# Patient Record
Sex: FEMALE | Race: WHITE | Employment: UNEMPLOYED | URBAN - NONMETROPOLITAN AREA
[De-identification: names, ages, dates, MRNs, and addresses within clinical notes are randomized per-mention and may not be internally consistent; named-entity substitution may affect disease eponyms.]

---

## 2017-12-21 ENCOUNTER — OFFICE VISIT (OUTPATIENT)
Dept: OPTOMETRY | Age: 22
End: 2017-12-21
Payer: COMMERCIAL

## 2017-12-21 DIAGNOSIS — H52.03 HYPEROPIA OF BOTH EYES WITH ASTIGMATISM: Primary | ICD-10-CM

## 2017-12-21 DIAGNOSIS — H52.203 HYPEROPIA OF BOTH EYES WITH ASTIGMATISM: Primary | ICD-10-CM

## 2017-12-21 PROCEDURE — 92014 COMPRE OPH EXAM EST PT 1/>: CPT | Performed by: OPTOMETRIST

## 2017-12-21 RX ORDER — BENOXINATE HCL/FLUORESCEIN SOD 0.4%-0.25%
1 DROPS OPHTHALMIC (EYE) ONCE
Status: COMPLETED | OUTPATIENT
Start: 2017-12-21 | End: 2017-12-21

## 2017-12-21 RX ADMIN — Medication 1 DROP: at 10:32

## 2017-12-21 ASSESSMENT — REFRACTION_MANIFEST
OS_SPHERE: +3.75
OS_AXIS: 030
OD_AXIS: 145
OS_CYLINDER: -0.50
OD_SPHERE: +3.50
OD_CYLINDER: -0.75

## 2017-12-21 ASSESSMENT — REFRACTION_WEARINGRX
OS_SPHERE: +3.50
OD_CYLINDER: -0.50
OS_AXIS: 025
OS_CYLINDER: -0.50
SPECS_TYPE: SVL
OD_AXIS: 150
OD_SPHERE: +3.00

## 2017-12-21 ASSESSMENT — VISUAL ACUITY
CORRECTION_TYPE: GLASSES
OS_CC: 20/25
METHOD: SNELLEN - LINEAR

## 2017-12-21 ASSESSMENT — KERATOMETRY
OS_K2POWER_DIOPTERS: 44.50
OS_AXISANGLE_DEGREES: 114
OD_AXISANGLE_DEGREES: 073
OD_AXISANGLE2_DEGREES: 163
OS_AXISANGLE2_DEGREES: 024
OD_K2POWER_DIOPTERS: 44.50
OS_K1POWER_DIOPTERS: 43.50
OD_K1POWER_DIOPTERS: 43.50

## 2017-12-21 ASSESSMENT — TONOMETRY
OS_IOP_MMHG: 17
IOP_METHOD: APPLANATION W FLURESS DROP
OD_IOP_MMHG: 17

## 2017-12-21 ASSESSMENT — SLIT LAMP EXAM - LIDS
COMMENTS: NORMAL
COMMENTS: NORMAL

## 2021-07-14 ENCOUNTER — OFFICE VISIT (OUTPATIENT)
Dept: FAMILY MEDICINE CLINIC | Age: 26
End: 2021-07-14
Payer: MEDICARE

## 2021-07-14 VITALS
RESPIRATION RATE: 16 BRPM | SYSTOLIC BLOOD PRESSURE: 102 MMHG | TEMPERATURE: 97.7 F | WEIGHT: 196 LBS | HEART RATE: 76 BPM | DIASTOLIC BLOOD PRESSURE: 60 MMHG | OXYGEN SATURATION: 98 %

## 2021-07-14 DIAGNOSIS — R19.7 DIARRHEA, UNSPECIFIED TYPE: ICD-10-CM

## 2021-07-14 DIAGNOSIS — R11.2 NON-INTRACTABLE VOMITING WITH NAUSEA, UNSPECIFIED VOMITING TYPE: Primary | ICD-10-CM

## 2021-07-14 PROCEDURE — 99203 OFFICE O/P NEW LOW 30 MIN: CPT | Performed by: NURSE PRACTITIONER

## 2021-07-14 PROCEDURE — G8421 BMI NOT CALCULATED: HCPCS | Performed by: NURSE PRACTITIONER

## 2021-07-14 PROCEDURE — 99212 OFFICE O/P EST SF 10 MIN: CPT | Performed by: NURSE PRACTITIONER

## 2021-07-14 PROCEDURE — 4004F PT TOBACCO SCREEN RCVD TLK: CPT | Performed by: NURSE PRACTITIONER

## 2021-07-14 PROCEDURE — G8427 DOCREV CUR MEDS BY ELIG CLIN: HCPCS | Performed by: NURSE PRACTITIONER

## 2021-07-14 RX ORDER — QUETIAPINE FUMARATE 25 MG/1
TABLET, FILM COATED ORAL
COMMUNITY
Start: 2021-06-03

## 2021-07-14 SDOH — ECONOMIC STABILITY: FOOD INSECURITY: WITHIN THE PAST 12 MONTHS, YOU WORRIED THAT YOUR FOOD WOULD RUN OUT BEFORE YOU GOT MONEY TO BUY MORE.: NEVER TRUE

## 2021-07-14 SDOH — ECONOMIC STABILITY: FOOD INSECURITY: WITHIN THE PAST 12 MONTHS, THE FOOD YOU BOUGHT JUST DIDN'T LAST AND YOU DIDN'T HAVE MONEY TO GET MORE.: NEVER TRUE

## 2021-07-14 ASSESSMENT — PATIENT HEALTH QUESTIONNAIRE - PHQ9
SUM OF ALL RESPONSES TO PHQ9 QUESTIONS 1 & 2: 0
2. FEELING DOWN, DEPRESSED OR HOPELESS: 0
SUM OF ALL RESPONSES TO PHQ QUESTIONS 1-9: 0
SUM OF ALL RESPONSES TO PHQ QUESTIONS 1-9: 0
1. LITTLE INTEREST OR PLEASURE IN DOING THINGS: 0
SUM OF ALL RESPONSES TO PHQ QUESTIONS 1-9: 0

## 2021-07-14 ASSESSMENT — ENCOUNTER SYMPTOMS
COUGH: 0
VOMITING: 1
ABDOMINAL PAIN: 0
SHORTNESS OF BREATH: 0
DIARRHEA: 1
WHEEZING: 0
NAUSEA: 1

## 2021-07-14 ASSESSMENT — SOCIAL DETERMINANTS OF HEALTH (SDOH): HOW HARD IS IT FOR YOU TO PAY FOR THE VERY BASICS LIKE FOOD, HOUSING, MEDICAL CARE, AND HEATING?: NOT HARD AT ALL

## 2021-07-14 NOTE — PROGRESS NOTES
[address]  [address] [address]  Dept: [phone]  Dept Fax: [phone]  Loc: [phone]        CHIEF COMPLAINT       Chief Complaint   Patient presents with    Emesis     diarrhea, fatigue  Sx for 2 days. She reports to have vomited 6-7 times- anytime she tries to eat. She states she has had one dose of Moderna Covid vaccine- overdue for 2nd. Nurses Notes reviewed and I agree except as noted in the HPI. HISTORY OF PRESENT ILLNESS   Satnam Manjarrez is a 27 y.o. female who presents to [facility] today (7/14/2021) for evaluation of:   Nausea & Vomiting  This is a new problem. The current episode started in the past 7 days (7/12/21). The problem occurs 2 to 4 times per day (last emesis was last night). The problem has been unchanged. Associated symptoms include fatigue, nausea and vomiting (last emesis last night). Pertinent negatives include no abdominal pain, chest pain, chills, congestion, coughing or fever. She has tried nothing for the symptoms. Brother was ill with similar symptoms, was seen in walk-in care yesterday. Pt states rides to work with brother regularly. Pt has received one dose of 2-step covid vaccine. REVIEW OF SYSTEMS     Review of Systems   Constitutional: Positive for fatigue. Negative for chills and fever. HENT: Negative for congestion. Respiratory: Negative for cough, shortness of breath and wheezing. Cardiovascular: Negative for chest pain. Gastrointestinal: Positive for diarrhea (ocurring every couple hours), nausea and vomiting (last emesis last night). Negative for abdominal pain. PAST MEDICAL HISTORY         Diagnosis Date    ADHD (attention deficit hyperactivity disorder)     Hyperopia with astigmatism        SURGICAL HISTORY     Patient  has no past surgical history on file.     CURRENT MEDICATIONS Outpatient Medications Prior to Visit   Medication Sig Dispense Refill    amphetamine-dextroamphetamine (ADDERALL, 30MG,) 30 MG tablet Take 20 mg by mouth daily.  QUEtiapine (SEROQUEL) 25 MG tablet take 1 tablet by mouth once daily      ALPRAZolam (XANAX) 0.5 MG tablet  (Patient not taking: Reported on 7/14/2021)      PROAIR  (90 BASE) MCG/ACT inhaler  (Patient not taking: Reported on 7/14/2021)       No facility-administered medications prior to visit. ALLERGIES     Patient is is allergic to morphine, zoloft [sertraline], bactrim [sulfamethoxazole-trimethoprim], and penicillins. FAMILY HISTORY     Patient's family history is not on file. SOCIAL HISTORY     Patient  reports that she has been smoking. She has never used smokeless tobacco.    PHYSICAL EXAM     VITALS  BP: 102/60, Temp: 97.7 °F (36.5 °C), Pulse: 76, Resp: 16, SpO2: 98 %  Physical Exam  Vitals reviewed. Constitutional:       General: She is not in acute distress. Appearance: She is not ill-appearing. HENT:      Mouth/Throat:      Mouth: Mucous membranes are moist.   Cardiovascular:      Rate and Rhythm: Normal rate and regular rhythm. Pulmonary:      Effort: Pulmonary effort is normal. No respiratory distress. Breath sounds: Normal breath sounds. No wheezing or rhonchi. Abdominal:      General: Bowel sounds are increased. There is no distension. Palpations: Abdomen is soft. Tenderness: There is no abdominal tenderness. There is no guarding or rebound. Musculoskeletal:      Cervical back: Normal range of motion and neck supple. Lymphadenopathy:      Cervical: No cervical adenopathy. Skin:     General: Skin is warm and dry. Capillary Refill: Capillary refill takes less than 2 seconds. Neurological:      General: No focal deficit present. Mental Status: She is alert. DIAGNOSTIC RESULTS   Labs:No results found for this visit on 07/14/21.     IMAGING:        CLINICAL COURSE: Vitals:    07/14/21 1149   BP: 102/60   Site: Right Upper Arm   Position: Sitting   Cuff Size: Medium Adult   Pulse: 76   Resp: 16   Temp: 97.7 °F (36.5 °C)   SpO2: 98%   Weight: 196 lb (88.9 kg)           PROCEDURES:  None  FINAL IMPRESSION      1. Non-intractable vomiting with nausea, unspecified vomiting type    2. Diarrhea, unspecified type         DISPOSITION/PLAN   Discussed with pt that GI symptoms are likely related to viral infection. Discussed supportive measures such as increasing fluids, adding electrolyte replacement drinks, eating bland, BRAT diet foods, and trying pepto or imodium if needed. Instructed pt to encourage all family members to wash hands well and to clean common surfaces to prevent spreading virus. Monitor symptoms; if worsening or failing to improve, return to clinic. Patient Instructions     Recommend increasing your water intake. For diarrhea, adding electrolyte replacements such as pedialyte or gatorade is recommended. Suggest eating bland, mild foods such as crackers, toast, rice, bananas, applesauce, jello, chicken soup, potatoes, noodles, oatmeal, etc.  Avoid high-fat, greasy, heavy foods until you are feeling better. Dairy can also be difficult to digest at this time, although yogurt can be beneficial. Can take pepto or imodium for symptoms. Monitor your symptoms. If symptoms worsen, fail to improve, or you develop new symptoms, please return to clinic for further evaluation. Patient Education        Nausea and Vomiting: Care Instructions  Your Care Instructions     When you are nauseated, you may feel weak and sweaty and notice a lot of saliva in your mouth. Nausea often leads to vomiting. Most of the time you do not need to worry about nausea and vomiting, but they can be signs of other illnesses. Two common causes of nausea and vomiting are stomach flu and food poisoning.  Nausea and vomiting from viral stomach flu will usually start to improve within 24 hours. Nausea and vomiting from food poisoning may last from 12 to 48 hours. The doctor has checked you carefully, but problems can develop later. If you notice any problems or new symptoms, get medical treatment right away. Follow-up care is a key part of your treatment and safety. Be sure to make and go to all appointments, and call your doctor if you are having problems. It's also a good idea to know your test results and keep a list of the medicines you take. How can you care for yourself at home? · To prevent dehydration, drink plenty of fluids. Choose water and other caffeine-free clear liquids until you feel better. If you have kidney, heart, or liver disease and have to limit fluids, talk with your doctor before you increase the amount of fluids you drink. · Rest in bed until you feel better. · When you are able to eat, try clear soups, mild foods, and liquids until all symptoms are gone for 12 to 48 hours. Other good choices include dry toast, crackers, cooked cereal, and gelatin dessert, such as Jell-O. When should you call for help? Call 911 anytime you think you may need emergency care. For example, call if:    · You passed out (lost consciousness). Call your doctor now or seek immediate medical care if:    · You have symptoms of dehydration, such as:  ? Dry eyes and a dry mouth. ? Passing only a little urine. ? Feeling thirstier than usual.     · You have new or worsening belly pain.     · You have a new or higher fever.     · You vomit blood or what looks like coffee grounds. Watch closely for changes in your health, and be sure to contact your doctor if:    · You have ongoing nausea and vomiting.     · Your vomiting is getting worse.     · Your vomiting lasts longer than 2 days.     · You are not getting better as expected. Where can you learn more? Go to https://chbeenaeb.One[employer] Water. org and sign in to your [facility] account.  Enter 20 [id number] in the [employer] box to clear liquids until you feel better. If you have kidney, heart, or liver disease and have to limit fluids, talk with your doctor before you increase the amount of fluids you drink. · Begin eating small amounts of mild foods the next day, if you feel like it. ? Try yogurt that has live cultures of Lactobacillus. (Check the label.)  ? Avoid spicy foods, fruits, alcohol, and caffeine until 48 hours after all symptoms are gone. ? Avoid chewing gum that contains sorbitol. ? Avoid dairy products (except for yogurt with Lactobacillus) while you have diarrhea and for 3 days after symptoms are gone. · The doctor may recommend that you take over-the-counter medicine, such as loperamide (Imodium), if you still have diarrhea after 6 hours. Read and follow all instructions on the label. Do not use this medicine if you have bloody diarrhea, a high fever, or other signs of serious illness. Call your doctor if you think you are having a problem with your medicine. When should you call for help? Call 911 anytime you think you may need emergency care. For example, call if:    · You passed out (lost consciousness).     · Your stools are maroon or very bloody. Call your doctor now or seek immediate medical care if:    · You are dizzy or lightheaded, or you feel like you may faint.     · Your stools are black and look like tar, or they have streaks of blood.     · You have new or worse belly pain.     · You have symptoms of dehydration, such as:  ? Dry eyes and a dry mouth. ? Passing only a little urine. ? Feeling thirstier than usual.     · You have a new or higher fever. Watch closely for changes in your health, and be sure to contact your doctor if:    · Your diarrhea is getting worse.     · You see pus in the diarrhea.     · You are not getting better after 2 days (48 hours). Where can you learn more? Go to https://chpedoeb.KnexxLocal. org and sign in to your [employer] account.  Enter Q006 in the [address] Information box to learn more about \"Diarrhea: Care Instructions. \"     If you do not have an account, please click on the \"Sign Up Now\" link. Current as of: October 19, 2020               Content Version: 12.9  © 2006-2021 Healthwise, Incorporated. Care instructions adapted under license by [facility] ([facility]). If you have questions about a medical condition or this instruction, always ask your healthcare professional. [place] [address] any warranty or liability for your use of this information. The use, risks, benefits, and potential side effects of prescribed and/or recommended medications were discussed. All questions were answered and the patient/caregiver voiced understanding. No orders of the defined types were placed in this encounter. Outpatient Encounter Medications as of 7/14/2021   Medication Sig Dispense Refill    amphetamine-dextroamphetamine (ADDERALL, 30MG,) 30 MG tablet Take 20 mg by mouth daily.  QUEtiapine (SEROQUEL) 25 MG tablet take 1 tablet by mouth once daily      [DISCONTINUED] ALPRAZolam (XANAX) 0.5 MG tablet  (Patient not taking: Reported on 7/14/2021)      [DISCONTINUED] PROAIR  (90 BASE) MCG/ACT inhaler  (Patient not taking: Reported on 7/14/2021)       No facility-administered encounter medications on file as of 7/14/2021. Return if symptoms worsen or fail to improve.                 Electronically signed by JW Blackburn CNP on 7/14/2021 at 1:34 PM

## 2021-07-14 NOTE — PATIENT INSTRUCTIONS
Recommend increasing your water intake. For diarrhea, adding electrolyte replacements such as pedialyte or gatorade is recommended. Suggest eating bland, mild foods such as crackers, toast, rice, bananas, applesauce, jello, chicken soup, potatoes, noodles, oatmeal, etc.  Avoid high-fat, greasy, heavy foods until you are feeling better. Dairy can also be difficult to digest at this time, although yogurt can be beneficial. Can take pepto or imodium for symptoms. Monitor your symptoms. If symptoms worsen, fail to improve, or you develop new symptoms, please return to clinic for further evaluation. Patient Education        Nausea and Vomiting: Care Instructions  Your Care Instructions     When you are nauseated, you may feel weak and sweaty and notice a lot of saliva in your mouth. Nausea often leads to vomiting. Most of the time you do not need to worry about nausea and vomiting, but they can be signs of other illnesses. Two common causes of nausea and vomiting are stomach flu and food poisoning. Nausea and vomiting from viral stomach flu will usually start to improve within 24 hours. Nausea and vomiting from food poisoning may last from 12 to 48 hours. The doctor has checked you carefully, but problems can develop later. If you notice any problems or new symptoms, get medical treatment right away. Follow-up care is a key part of your treatment and safety. Be sure to make and go to all appointments, and call your doctor if you are having problems. It's also a good idea to know your test results and keep a list of the medicines you take. How can you care for yourself at home? · To prevent dehydration, drink plenty of fluids. Choose water and other caffeine-free clear liquids until you feel better. If you have kidney, heart, or liver disease and have to limit fluids, talk with your doctor before you increase the amount of fluids you drink. · Rest in bed until you feel better.   · When you are able to eat, try clear soups, mild foods, and liquids until all symptoms are gone for 12 to 48 hours. Other good choices include dry toast, crackers, cooked cereal, and gelatin dessert, such as Jell-O. When should you call for help? Call 911 anytime you think you may need emergency care. For example, call if:    · You passed out (lost consciousness). Call your doctor now or seek immediate medical care if:    · You have symptoms of dehydration, such as:  ? Dry eyes and a dry mouth. ? Passing only a little urine. ? Feeling thirstier than usual.     · You have new or worsening belly pain.     · You have a new or higher fever.     · You vomit blood or what looks like coffee grounds. Watch closely for changes in your health, and be sure to contact your doctor if:    · You have ongoing nausea and vomiting.     · Your vomiting is getting worse.     · Your vomiting lasts longer than 2 days.     · You are not getting better as expected. Where can you learn more? Go to https://Ibotta.[employer]s. org and sign in to your [employer] account. Enter 13 [id number] in the [employer] box to learn more about \"Nausea and Vomiting: Care Instructions. \"     If you do not have an account, please click on the \"Sign Up Now\" link. Current as of: October 19, 2020               Content Version: 12.9  © 0544-7244 Healthwise, Incorporated. Care instructions adapted under license by [facility] ([facility]). If you have questions about a medical condition or this instruction, always ask your healthcare professional. [place] [address] any warranty or liability for your use of this information. Patient Education        Diarrhea: Care Instructions  Your Care Instructions     Diarrhea is loose, watery stools (bowel movements). The exact cause is often hard to find. Sometimes diarrhea is your body's way of getting rid of what caused an upset stomach. Viruses, food poisoning, and many medicines can cause diarrhea.  Some people get diarrhea in response to emotional stress, anxiety, or certain foods. Almost everyone has diarrhea now and then. It usually isn't serious, and your stools will return to normal soon. The important thing to do is replace the fluids you have lost, so you can prevent dehydration. The doctor has checked you carefully, but problems can develop later. If you notice any problems or new symptoms, get medical treatment right away. Follow-up care is a key part of your treatment and safety. Be sure to make and go to all appointments, and call your doctor if you are having problems. It's also a good idea to know your test results and keep a list of the medicines you take. How can you care for yourself at home? · Watch for signs of dehydration, which means your body has lost too much water. Dehydration is a serious condition and should be treated right away. Signs of dehydration are:  ? Increasing thirst and dry eyes and mouth. ? Feeling faint or lightheaded. ? A smaller amount of urine than normal.  · To prevent dehydration, drink plenty of fluids. Choose water and other caffeine-free clear liquids until you feel better. If you have kidney, heart, or liver disease and have to limit fluids, talk with your doctor before you increase the amount of fluids you drink. · Begin eating small amounts of mild foods the next day, if you feel like it. ? Try yogurt that has live cultures of Lactobacillus. (Check the label.)  ? Avoid spicy foods, fruits, alcohol, and caffeine until 48 hours after all symptoms are gone. ? Avoid chewing gum that contains sorbitol. ? Avoid dairy products (except for yogurt with Lactobacillus) while you have diarrhea and for 3 days after symptoms are gone. · The doctor may recommend that you take over-the-counter medicine, such as loperamide (Imodium), if you still have diarrhea after 6 hours. Read and follow all instructions on the label.  Do not use this medicine if you have bloody diarrhea, a high fever, or other signs of serious illness. Call your doctor if you think you are having a problem with your medicine. When should you call for help? Call 911 anytime you think you may need emergency care. For example, call if:    · You passed out (lost consciousness).     · Your stools are maroon or very bloody. Call your doctor now or seek immediate medical care if:    · You are dizzy or lightheaded, or you feel like you may faint.     · Your stools are black and look like tar, or they have streaks of blood.     · You have new or worse belly pain.     · You have symptoms of dehydration, such as:  ? Dry eyes and a dry mouth. ? Passing only a little urine. ? Feeling thirstier than usual.     · You have a new or higher fever. Watch closely for changes in your health, and be sure to contact your doctor if:    · Your diarrhea is getting worse.     · You see pus in the diarrhea.     · You are not getting better after 2 days (48 hours). Where can you learn more? Go to https://Metrolight.Minubo. org and sign in to your [employer] account. Enter S241 in the [employer] box to learn more about \"Diarrhea: Care Instructions. \"     If you do not have an account, please click on the \"Sign Up Now\" link. Current as of: October 19, 2020               Content Version: 12.9  © 2777-4068 Healthwise, Incorporated. Care instructions adapted under license by [facility] ([facility]). If you have questions about a medical condition or this instruction, always ask your healthcare professional. [place] [address] any warranty or liability for your use of this information.

## 2021-07-14 NOTE — LETTER
[address] [facility] [place]  [address] [address]  Phone: [phone]  Fax: [phone]    JW Melvin CNP        July 14, 2021     Patient: Jarad Davis   YOB: 1995   Date of Visit: 7/14/2021       To Whom it May Concern:    Alexsander Pardo was seen in my clinic on 7/14/2021. She may return to work on 7/15/21. If you have any questions or concerns, please don't hesitate to call.     Sincerely,         JW Melvin CNP

## 2021-07-14 NOTE — LETTER
[address] [facility]  [address]  Phone: [phone]  Fax: [phone]    JW Vuong NP        07/14/2021    Patient: Erick Daughters   YOB: 1995   Date of Visit: 07/14/2021       To Whom it May Concern:    Dixie was seen in my clinic on 07/14/2021 . They may return to work/school after a negative covid test result (results expected within 5 days). If their test is positive they will need to quarantine for a total of ten days and be fever free at that time. If you have any questions or concerns, please don't hesitate to call.     Sincerely,         ,Josselin Worley NP

## 2022-12-14 ENCOUNTER — OFFICE VISIT (OUTPATIENT)
Dept: SURGERY | Age: 27
End: 2022-12-14
Payer: MEDICARE

## 2022-12-14 VITALS
TEMPERATURE: 98 F | HEART RATE: 70 BPM | HEIGHT: 62 IN | WEIGHT: 181 LBS | SYSTOLIC BLOOD PRESSURE: 120 MMHG | DIASTOLIC BLOOD PRESSURE: 70 MMHG | RESPIRATION RATE: 16 BRPM | BODY MASS INDEX: 33.31 KG/M2 | OXYGEN SATURATION: 99 %

## 2022-12-14 DIAGNOSIS — R19.8 CHANGE IN BOWEL MOVEMENT: Primary | ICD-10-CM

## 2022-12-14 PROBLEM — K59.00 CONSTIPATION: Status: ACTIVE | Noted: 2022-12-14

## 2022-12-14 PROCEDURE — G8484 FLU IMMUNIZE NO ADMIN: HCPCS | Performed by: SURGERY

## 2022-12-14 PROCEDURE — G8419 CALC BMI OUT NRM PARAM NOF/U: HCPCS | Performed by: SURGERY

## 2022-12-14 PROCEDURE — G8427 DOCREV CUR MEDS BY ELIG CLIN: HCPCS | Performed by: SURGERY

## 2022-12-14 PROCEDURE — 4004F PT TOBACCO SCREEN RCVD TLK: CPT | Performed by: SURGERY

## 2022-12-14 PROCEDURE — 99203 OFFICE O/P NEW LOW 30 MIN: CPT | Performed by: SURGERY

## 2022-12-14 RX ORDER — BUSPIRONE HYDROCHLORIDE 10 MG/1
10 TABLET ORAL 3 TIMES DAILY
COMMUNITY

## 2022-12-14 NOTE — ASSESSMENT & PLAN NOTE
Patient has had fairly significant change in bowel habits over past 1 year. However on further discussion with the patient seems like she has a very longstanding history of constipation even extending back to childhood. Based on the history today do have some concern for some sort of functional disorder of the colon. She does also take some medications that certainly can lead to constipation and so this potentially is medication induced as well. There is really been no treatments tried for her constipation so I am going to go ahead and start her on a regimen of MiraLAX, dulcolax and fiber supplement daily. We are going to go ahead and start that now and monitor for improvement in symptoms. I will go ahead and get her scheduled for colonoscopy as well for further evaluation. With a longstanding history of constipation extending back to childhood I think proceeding now is warranted and will need to obtain biopsies of the colon as well. Risks of the procedure including bleeding, infection, perforation, need for the surgery and anesthesia risk are discussed and consent is obtained.

## 2022-12-14 NOTE — PROGRESS NOTES
David Marcano is a 32 y.o. female who presents today for further evaluation of constipation and change in bowel movements. Patient is referred from her PCP. On discussion with the patient today she states that she had her gallbladder removed in high school due to gallstones as since that time had been having loose bowel movements and that was her normal type of bowel movement. However she states that about a year ago she began to notice that she was getting a lot of constipation and would go several days between bowel movements and when she would have a bowel movement oftentimes it will be very hard and difficult to pass. Seems like she has these episodes where she will have several days of constipation and then have a very large \"blowout\" bowel movement with some associated diarrhea and then that will continue to recur in cycles. She states sometimes the bowel movements are extremely large and very difficult to pass. Often times she states that she will become diaphoretic and had abdominal pain prior to the bowel movements and this resolves when she has had bowel movement. Denies any blood per rectum. No melena. She does state that often times the abdominal pain is in the lower abdomen. Crampy in nature. Also reports that sometimes this will make her sick enough that she will actually get nauseous and occasionally throw up nonbloody nonbilious material.  When this started about a year ago she was actually referred to surgery at that time but did not follow-up. More recently she has been seen back by her PCP and it seems that her PCP started her on some sort of medication though it was just a one-time dose and she states that she had to drink for doses of a liquid but she does not remember the name of it. Does not report any significant improvement after that. Has not been trialed on any other medication for constipation.     I discussed things with her further she does report that as a child she remembers having to drink mineral oil because of constipation even as a young child. Does not seem like she ever had any work-up of that at that time. She does have a family history of colon cancer in second and third degree relatives but no first-degree relatives with colon cancer. No known family history of Crohn's or ulcerative colitis. Denies any changes in diet that corresponds to the onset of these symptoms. Not have any significant medical history but does seem that she is on Adderall, BuSpar and Seroquel. Patient states that she is not taking the Seroquel currently. As we talk about the medications she does report that she started the BuSpar 8 to 9 months ago. Past Medical History:   Diagnosis Date    ADHD (attention deficit hyperactivity disorder)     Hyperopia with astigmatism        Past Surgical History:   Procedure Laterality Date    CHOLECYSTECTOMY      TONSILLECTOMY      WISDOM TOOTH EXTRACTION         Current Outpatient Medications   Medication Sig Dispense Refill    busPIRone (BUSPAR) 10 MG tablet Take 10 mg by mouth 3 times daily      amphetamine-dextroamphetamine (ADDERALL) 30 MG tablet Take 20 mg by mouth daily. QUEtiapine (SEROQUEL) 25 MG tablet take 1 tablet by mouth once daily (Patient not taking: Reported on 12/14/2022)       No current facility-administered medications for this visit.        Allergies   Allergen Reactions    Morphine Other (See Comments)     \"panic attack\"    Zoloft [Sertraline]      Rage    Bactrim [Sulfamethoxazole-Trimethoprim] Rash    Penicillins Rash       Family History   Problem Relation Age of Onset    Irritable Bowel Syndrome Mother     Cancer Paternal Aunt         Colon Cancer    Cancer Paternal Uncle         Colon Cancer    Glaucoma Neg Hx     Diabetes Neg Hx     Cataracts Neg Hx        Social History     Socioeconomic History    Marital status: Single     Spouse name: Not on file    Number of children: Not on file    Years of education: Not on file    Highest education level: Not on file   Occupational History    Not on file   Tobacco Use    Smoking status: Every Day    Smokeless tobacco: Never   Substance and Sexual Activity    Alcohol use: Not on file    Drug use: Not on file    Sexual activity: Not on file   Other Topics Concern    Not on file   Social History Narrative    Not on file     Social Determinants of Health     Financial Resource Strain: Not on file   Food Insecurity: Not on file   Transportation Needs: Not on file   Physical Activity: Not on file   Stress: Not on file   Social Connections: Not on file   Intimate Partner Violence: Not on file   Housing Stability: Not on file       ROS:   Review of Systems - History obtained from mother  General ROS: negative  Psychological ROS: negative  Ophthalmic ROS: negative  Respiratory ROS: no cough, shortness of breath, or wheezing  Cardiovascular ROS: no chest pain or dyspnea on exertion  Gastrointestinal ROS: per HPI  Genito-Urinary ROS: no dysuria, trouble voiding, or hematuria  Musculoskeletal ROS: negative  Dermatological ROS: negative      Objective   Vitals:    12/14/22 1150   BP: 120/70   Pulse: 70   Resp: 16   Temp: 98 °F (36.7 °C)   SpO2: 99%     General:in no apparent distress and well developed and well nourished  Eyes: No gross abnormalities. Ears, Nose, Throat: hearing grossly normal bilaterally  Neck: neck supple and non tender without mass  Lungs: clear to auscultation without wheezes or rales   Heart: S1S2, no mumurs, RRR  Abdomen: Soft, nondistended, mild tenderness palpation in right abdomen without guarding or rebound, bowel sounds present. Extremity: negative  Neuro: CN II-XII grossly intact      1. Change in bowel movement  Assessment & Plan:  Patient has had fairly significant change in bowel habits over past 1 year. However on further discussion with the patient seems like she has a very longstanding history of constipation even extending back to childhood.   Based on the history today do have some concern for some sort of functional disorder of the colon. She does also take some medications that certainly can lead to constipation and so this potentially is medication induced as well. There is really been no treatments tried for her constipation so I am going to go ahead and start her on a regimen of MiraLAX, dulcolax and fiber supplement daily. We are going to go ahead and start that now and monitor for improvement in symptoms. I will go ahead and get her scheduled for colonoscopy as well for further evaluation. With a longstanding history of constipation extending back to childhood I think proceeding now is warranted and will need to obtain biopsies of the colon as well. Risks of the procedure including bleeding, infection, perforation, need for the surgery and anesthesia risk are discussed and consent is obtained.              (Please note that portions of this note were completed with a voice recognition program.  Efforts were made to edit the dictations but occasionally words are mis-transcribed.)

## 2023-01-23 DIAGNOSIS — R19.8 CHANGE IN BOWEL MOVEMENT: Primary | ICD-10-CM

## 2023-01-23 DIAGNOSIS — K58.1 IRRITABLE BOWEL SYNDROME WITH CONSTIPATION: ICD-10-CM

## 2023-01-23 NOTE — PROGRESS NOTES
Referral placed per Dr. Luis Alberto Keyes, Patient informed, Referral sent to [address] [place] [address].

## 2023-02-16 ENCOUNTER — TELEPHONE (OUTPATIENT)
Dept: SURGERY | Age: 28
End: 2023-02-16

## 2023-02-16 NOTE — TELEPHONE ENCOUNTER
GI referral placed to [facility] in [place], [place]. Patient made aware. Referral sent back on 1/23/23.  Referral closed

## 2023-11-16 ENCOUNTER — HOSPITAL ENCOUNTER (OUTPATIENT)
Age: 28
Discharge: HOME | End: 2023-11-18
Attending: INTERNAL MEDICINE
Payer: MEDICAID

## 2023-11-16 DIAGNOSIS — R55 SYNCOPE AND COLLAPSE: ICD-10-CM

## 2023-11-16 DIAGNOSIS — R00.2 PALPITATIONS: ICD-10-CM

## 2023-11-16 PROCEDURE — 93242 EXT ECG>48HR<7D RECORDING: CPT
